# Patient Record
Sex: MALE | Race: WHITE | NOT HISPANIC OR LATINO | URBAN - METROPOLITAN AREA
[De-identification: names, ages, dates, MRNs, and addresses within clinical notes are randomized per-mention and may not be internally consistent; named-entity substitution may affect disease eponyms.]

---

## 2022-06-03 ENCOUNTER — WEB ENCOUNTER (OUTPATIENT)
Dept: URBAN - METROPOLITAN AREA CLINIC 111 | Facility: CLINIC | Age: 82
End: 2022-06-03

## 2022-06-08 ENCOUNTER — OFFICE VISIT (OUTPATIENT)
Dept: URBAN - METROPOLITAN AREA CLINIC 111 | Facility: CLINIC | Age: 82
End: 2022-06-08
Payer: MEDICARE

## 2022-06-08 ENCOUNTER — TELEPHONE ENCOUNTER (OUTPATIENT)
Dept: URBAN - METROPOLITAN AREA CLINIC 111 | Facility: CLINIC | Age: 82
End: 2022-06-08

## 2022-06-08 DIAGNOSIS — I48.91 AFIB: ICD-10-CM

## 2022-06-08 DIAGNOSIS — R19.7 DIARRHEA: ICD-10-CM

## 2022-06-08 DIAGNOSIS — C61 PROSTATE CANCER: ICD-10-CM

## 2022-06-08 DIAGNOSIS — K51.90 ULCERATIVE COLITIS: ICD-10-CM

## 2022-06-08 PROCEDURE — 99204 OFFICE O/P NEW MOD 45 MIN: CPT | Performed by: INTERNAL MEDICINE

## 2022-06-08 NOTE — HPI-TODAY'S VISIT:
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  Patient reported he has ulcerative colitis for more than 20 years is being followed by Dr. Alonzo Ball.  He was treated in 2017 with Remicade every 4 weeks and then it lost its response he had flare in 2019 started on Humira which was increased to every week recently he has increased diarrhea abdominal cramping with a blood in the stool he has 5-6 bowel movement a day he thinks Humira is not working.  He reported he has a colonoscopy by Dr. Ball a year and a half ago.  I do not have records at the time of this evaluation.  He has stage IV prostate cancer with metastasis to the bone he had radiation a year ago to the spine.  The diarrhea has worsened and affecting his quality of life He has history of factor fibrillation he is on Eliquis

## 2022-06-09 ENCOUNTER — LAB OUTSIDE AN ENCOUNTER (OUTPATIENT)
Dept: URBAN - METROPOLITAN AREA CLINIC 23 | Facility: CLINIC | Age: 82
End: 2022-06-09

## 2022-06-10 ENCOUNTER — TELEPHONE ENCOUNTER (OUTPATIENT)
Dept: URBAN - METROPOLITAN AREA CLINIC 111 | Facility: CLINIC | Age: 82
End: 2022-06-10

## 2022-06-10 ENCOUNTER — LAB OUTSIDE AN ENCOUNTER (OUTPATIENT)
Dept: URBAN - METROPOLITAN AREA CLINIC 23 | Facility: CLINIC | Age: 82
End: 2022-06-10

## 2022-06-13 LAB
CALPROTECTIN, STOOL - QDX: (no result)
GASTROINTESTINAL PATHOGEN: (no result)

## 2022-06-22 ENCOUNTER — TELEPHONE ENCOUNTER (OUTPATIENT)
Dept: URBAN - METROPOLITAN AREA CLINIC 23 | Facility: CLINIC | Age: 82
End: 2022-06-22

## 2022-06-22 RX ORDER — BUDESONIDE 3 MG/1
TAKE 3 CAPSULE CAPSULE, COATED PELLETS ORAL
Qty: 130 | Refills: 2 | OUTPATIENT
Start: 2022-06-22

## 2022-06-30 ENCOUNTER — TELEPHONE ENCOUNTER (OUTPATIENT)
Dept: URBAN - METROPOLITAN AREA CLINIC 92 | Facility: CLINIC | Age: 82
End: 2022-06-30

## 2022-06-30 LAB
A/G RATIO: 2.2
ADALIMUMAB DRUG LEVEL: 11
ALBUMIN: 4.8
ALKALINE PHOSPHATASE: 62
ANTI-ADALIMUMAB ANTIBODY: <25
AST (SGOT): 22
BASO (ABSOLUTE): 0
BASOS: 1
BILIRUBIN, TOTAL: 0.5
BUN/CREATININE RATIO: 28
BUN: 35
CALCIUM: 9.6
CARBON DIOXIDE, TOTAL: 21
CHLORIDE: 103
CREATININE: 1.23
EGFR: 58
EOS (ABSOLUTE): 0.2
EOS: 2
FERRITIN, SERUM: 49
GLOBULIN, TOTAL: 2.2
GLUCOSE: 112
HBSAG SCREEN: NEGATIVE
HEMATOCRIT: 40.9
HEMATOLOGY COMMENTS:: (no result)
HEMOGLOBIN: 13.3
IMMATURE CELLS: (no result)
IMMATURE GRANS (ABS): 0
IMMATURE GRANULOCYTES: 0
IRON BIND.CAP.(TIBC): 322
IRON SATURATION: 34
IRON: 108
LYMPHS (ABSOLUTE): 1.1
LYMPHS: 15
MCH: 31.6
MCHC: 32.5
MCV: 97
MONOCYTES(ABSOLUTE): 0.7
MONOCYTES: 9
NEUTROPHILS (ABSOLUTE): 5.1
NEUTROPHILS: 73
NRBC: (no result)
PLATELETS: 273
POTASSIUM: 4.7
PROTEIN, TOTAL: 7
RBC: 4.21
RDW: 11.9
SODIUM: 138
UIBC: 214
VITAMIN B12: 810
VITAMIN D, 25-HYDROXY: 44.3
WBC: 7.1

## 2022-07-26 ENCOUNTER — TELEPHONE ENCOUNTER (OUTPATIENT)
Dept: URBAN - METROPOLITAN AREA CLINIC 111 | Facility: CLINIC | Age: 82
End: 2022-07-26

## 2022-08-02 ENCOUNTER — OFFICE VISIT (OUTPATIENT)
Dept: URBAN - METROPOLITAN AREA CLINIC 23 | Facility: CLINIC | Age: 82
End: 2022-08-02
Payer: MEDICARE

## 2022-08-02 ENCOUNTER — WEB ENCOUNTER (OUTPATIENT)
Dept: URBAN - METROPOLITAN AREA CLINIC 23 | Facility: CLINIC | Age: 82
End: 2022-08-02

## 2022-08-02 VITALS
HEIGHT: 67 IN | HEART RATE: 73 BPM | DIASTOLIC BLOOD PRESSURE: 74 MMHG | WEIGHT: 157 LBS | TEMPERATURE: 96.8 F | BODY MASS INDEX: 24.64 KG/M2 | SYSTOLIC BLOOD PRESSURE: 114 MMHG

## 2022-08-02 DIAGNOSIS — K52.832 LYMPHOCYTIC COLITIS: ICD-10-CM

## 2022-08-02 DIAGNOSIS — R19.7 DIARRHEA: ICD-10-CM

## 2022-08-02 DIAGNOSIS — K51.90 ULCERATIVE COLITIS: ICD-10-CM

## 2022-08-02 DIAGNOSIS — C61 PROSTATE CANCER: ICD-10-CM

## 2022-08-02 DIAGNOSIS — I48.91 AFIB: ICD-10-CM

## 2022-08-02 PROCEDURE — 99214 OFFICE O/P EST MOD 30 MIN: CPT | Performed by: INTERNAL MEDICINE

## 2022-08-02 RX ORDER — BUDESONIDE 3 MG/1
TAKE 3 CAPSULE CAPSULE, COATED PELLETS ORAL
Qty: 130 | Refills: 2 | Status: ACTIVE | COMMUNITY
Start: 2022-06-22

## 2022-08-02 RX ORDER — ADALIMUMAB 40MG/0.4ML
1 SUBQ Q  WEEK KIT SUBCUTANEOUS
Qty: 4 | Refills: 3 | OUTPATIENT
Start: 2022-08-02 | End: 2022-11-29

## 2022-08-02 NOTE — HPI-TODAY'S VISIT:
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  I saw him 2 months ago for second opinion for worsening diarrhea.  He had stool studies which was unremarkable he had lab although was unremarkable his Humira level was normal and no antibody.  He was complaining of worsening diarrhea.  I reviewed his colonoscopy in 2021 which revealed right side lymphocytic colitis and left-sided ulcerative colitis.  The mucosa in the right side of the colon was normal but random biopsy showed lymphocytic colitis.  He is on Humira every week I added budesonide 9 mg a day and he reports his diarrhea improved significantly.  He has 2-3 bowel movement a day no abdominal pain.  .  He has stage IV prostate cancer with metastasis to the bone he had radiation a year ago to the spine.  He has history of factor fibrillation he is on Eliquis

## 2022-08-04 PROBLEM — 399068003: Status: ACTIVE | Noted: 2022-06-10

## 2022-08-04 PROBLEM — 1187071008 LYMPHOCYTIC COLITIS: Status: ACTIVE | Noted: 2022-08-02

## 2022-08-04 PROBLEM — 49436004 ATRIAL FIBRILLATION: Status: ACTIVE | Noted: 2022-06-08

## 2022-08-08 ENCOUNTER — TELEPHONE ENCOUNTER (OUTPATIENT)
Dept: URBAN - METROPOLITAN AREA CLINIC 23 | Facility: CLINIC | Age: 82
End: 2022-08-08

## 2022-08-08 RX ORDER — ADALIMUMAB 40MG/0.4ML
1 SUBQ Q  WEEK KIT SUBCUTANEOUS
Qty: 4 PEN NEEDLE | Refills: 11 | OUTPATIENT
Start: 2022-08-08 | End: 2023-08-03

## 2022-09-08 ENCOUNTER — TELEPHONE ENCOUNTER (OUTPATIENT)
Dept: URBAN - METROPOLITAN AREA CLINIC 111 | Facility: CLINIC | Age: 82
End: 2022-09-08

## 2022-10-11 ENCOUNTER — OFFICE VISIT (OUTPATIENT)
Dept: URBAN - METROPOLITAN AREA CLINIC 23 | Facility: CLINIC | Age: 82
End: 2022-10-11

## 2022-10-15 ENCOUNTER — OUT OF OFFICE VISIT (OUTPATIENT)
Dept: URBAN - METROPOLITAN AREA MEDICAL CENTER 27 | Facility: MEDICAL CENTER | Age: 82
End: 2022-10-15
Payer: MEDICARE

## 2022-10-15 DIAGNOSIS — R13.10 ABNORMAL DEGLUTITION: ICD-10-CM

## 2022-10-15 DIAGNOSIS — M46.42 CERVICAL DISCITIS: ICD-10-CM

## 2022-10-15 DIAGNOSIS — K51.80 CHRONIC PANCOLONIC ULCERATIVE COLITIS: ICD-10-CM

## 2022-10-15 DIAGNOSIS — K26.9 CHILDHOOD DUODENAL ULCER: ICD-10-CM

## 2022-10-15 DIAGNOSIS — K22.2 ACQUIRED ESOPHAGEAL RING: ICD-10-CM

## 2022-10-15 DIAGNOSIS — R13.12 DYSPHAGIA: ICD-10-CM

## 2022-10-15 PROCEDURE — G8427 DOCREV CUR MEDS BY ELIG CLIN: HCPCS | Performed by: INTERNAL MEDICINE

## 2022-10-15 PROCEDURE — 99232 SBSQ HOSP IP/OBS MODERATE 35: CPT | Performed by: INTERNAL MEDICINE

## 2022-10-15 PROCEDURE — 99223 1ST HOSP IP/OBS HIGH 75: CPT | Performed by: INTERNAL MEDICINE

## 2022-10-15 PROCEDURE — 43246 EGD PLACE GASTROSTOMY TUBE: CPT | Performed by: INTERNAL MEDICINE

## 2022-10-17 ENCOUNTER — OUT OF OFFICE VISIT (OUTPATIENT)
Dept: URBAN - METROPOLITAN AREA MEDICAL CENTER 27 | Facility: MEDICAL CENTER | Age: 82
End: 2022-10-17
Payer: MEDICARE

## 2022-10-17 DIAGNOSIS — K51.80 CHRONIC PANCOLONIC ULCERATIVE COLITIS: ICD-10-CM

## 2022-10-17 DIAGNOSIS — M46.42 CERVICAL DISCITIS: ICD-10-CM

## 2022-10-17 DIAGNOSIS — R13.10 ABNORMAL DEGLUTITION: ICD-10-CM

## 2022-10-17 PROCEDURE — 99232 SBSQ HOSP IP/OBS MODERATE 35: CPT | Performed by: INTERNAL MEDICINE

## 2022-10-19 ENCOUNTER — OUT OF OFFICE VISIT (OUTPATIENT)
Dept: URBAN - METROPOLITAN AREA MEDICAL CENTER 27 | Facility: MEDICAL CENTER | Age: 82
End: 2022-10-19
Payer: MEDICARE

## 2022-10-19 DIAGNOSIS — K26.9 CHILDHOOD DUODENAL ULCER: ICD-10-CM

## 2022-10-19 DIAGNOSIS — R13.10 ABNORMAL DEGLUTITION: ICD-10-CM

## 2022-10-19 DIAGNOSIS — K51.80 CHRONIC PANCOLONIC ULCERATIVE COLITIS: ICD-10-CM

## 2022-10-19 DIAGNOSIS — M46.42 CERVICAL DISCITIS: ICD-10-CM

## 2022-10-19 PROCEDURE — 99232 SBSQ HOSP IP/OBS MODERATE 35: CPT | Performed by: INTERNAL MEDICINE

## 2022-10-26 ENCOUNTER — TELEPHONE ENCOUNTER (OUTPATIENT)
Dept: URBAN - METROPOLITAN AREA CLINIC 23 | Facility: CLINIC | Age: 82
End: 2022-10-26

## 2022-11-07 ENCOUNTER — TELEPHONE ENCOUNTER (OUTPATIENT)
Dept: URBAN - METROPOLITAN AREA CLINIC 23 | Facility: CLINIC | Age: 82
End: 2022-11-07

## 2022-12-01 ENCOUNTER — OFFICE VISIT (OUTPATIENT)
Dept: URBAN - METROPOLITAN AREA CLINIC 12 | Facility: CLINIC | Age: 82
End: 2022-12-01
Payer: MEDICARE

## 2022-12-01 VITALS
HEIGHT: 69 IN | DIASTOLIC BLOOD PRESSURE: 74 MMHG | SYSTOLIC BLOOD PRESSURE: 113 MMHG | WEIGHT: 166.8 LBS | TEMPERATURE: 97.7 F | BODY MASS INDEX: 24.71 KG/M2 | HEART RATE: 72 BPM

## 2022-12-01 DIAGNOSIS — K51.90 ULCERATIVE COLITIS: ICD-10-CM

## 2022-12-01 DIAGNOSIS — Z93.1 PEG (PERCUTANEOUS ENDOSCOPIC GASTROSTOMY) STATUS: ICD-10-CM

## 2022-12-01 DIAGNOSIS — R13.10 DYSPHAGIA: ICD-10-CM

## 2022-12-01 DIAGNOSIS — M46.22 OSTEOMYELITIS OF CERVICAL SPINE: ICD-10-CM

## 2022-12-01 PROCEDURE — 99214 OFFICE O/P EST MOD 30 MIN: CPT | Performed by: INTERNAL MEDICINE

## 2022-12-01 RX ORDER — BUDESONIDE 3 MG/1
TAKE 3 CAPSULE CAPSULE, COATED PELLETS ORAL
Qty: 130 | Refills: 2 | Status: ACTIVE | COMMUNITY
Start: 2022-06-22

## 2022-12-01 RX ORDER — ADALIMUMAB 40MG/0.4ML
1 SUBQ Q  WEEK KIT SUBCUTANEOUS
Qty: 4 PEN NEEDLE | Refills: 11 | Status: ACTIVE | COMMUNITY
Start: 2022-08-08 | End: 2023-08-03

## 2022-12-01 NOTE — HPI-TODAY'S VISIT:
82-year-old male with history of ulcerative colitis history of stage IV prostate cancer metastasized to the bone presented today for follow up after recent admission to the hospital in October 2022 for C4C9 osteomyelitis aspiration pneumonia oropharyngeal dysphagia.  Patient was treated with IV antibiotic he developed severe oropharyngeal dysphagia requiring feeding tube placement his Humira was held because of active sepsis and infection he is currently using tube feeding and tolerating it well his ulcerative colitis is in remission he has 1-2 bowel movement a day no blood in the stool no abdominal pain he gained weight since last visit he feels better.  He still has PICC line with IV treatment.   his colonoscopy in 2021 which revealed right side lymphocytic colitis and left-sided ulcerative colitis.  The mucosa in the right side of the colon was normal but random biopsy showed lymphocytic colitis.   .  He has stage IV prostate cancer with metastasis to the bone he had radiation a year ago to the spine.  He has history of factor fibrillation he is on Eliquis

## 2022-12-01 NOTE — PHYSICAL EXAM GASTROINTESTINAL
Abdomen , soft, nontender, nondistended , no guarding or rigidity , no masses palpable , normal bowel sounds , Liver and Spleen,  no hepatosplenomegaly , liver nontender feeding tube placed

## 2022-12-07 ENCOUNTER — TELEPHONE ENCOUNTER (OUTPATIENT)
Dept: URBAN - METROPOLITAN AREA CLINIC 23 | Facility: CLINIC | Age: 82
End: 2022-12-07

## 2023-02-03 ENCOUNTER — OFFICE VISIT (OUTPATIENT)
Dept: URBAN - METROPOLITAN AREA CLINIC 23 | Facility: CLINIC | Age: 83
End: 2023-02-03
Payer: MEDICARE

## 2023-02-03 ENCOUNTER — LAB OUTSIDE AN ENCOUNTER (OUTPATIENT)
Dept: URBAN - METROPOLITAN AREA CLINIC 23 | Facility: CLINIC | Age: 83
End: 2023-02-03

## 2023-02-03 VITALS
HEART RATE: 76 BPM | DIASTOLIC BLOOD PRESSURE: 73 MMHG | SYSTOLIC BLOOD PRESSURE: 115 MMHG | TEMPERATURE: 97 F | WEIGHT: 170 LBS | HEIGHT: 69 IN | BODY MASS INDEX: 25.18 KG/M2

## 2023-02-03 DIAGNOSIS — M46.22 OSTEOMYELITIS OF CERVICAL SPINE: ICD-10-CM

## 2023-02-03 DIAGNOSIS — Z93.1 PEG (PERCUTANEOUS ENDOSCOPIC GASTROSTOMY) STATUS: ICD-10-CM

## 2023-02-03 DIAGNOSIS — K51.90 ULCERATIVE COLITIS: ICD-10-CM

## 2023-02-03 DIAGNOSIS — R13.10 DYSPHAGIA: ICD-10-CM

## 2023-02-03 PROCEDURE — 99214 OFFICE O/P EST MOD 30 MIN: CPT | Performed by: INTERNAL MEDICINE

## 2023-02-03 RX ORDER — ADALIMUMAB 40MG/0.4ML
1 SUBQ Q  WEEK KIT SUBCUTANEOUS
Qty: 4 PEN NEEDLE | Refills: 11 | Status: ACTIVE | COMMUNITY
Start: 2022-08-08 | End: 2023-08-03

## 2023-02-03 RX ORDER — BUDESONIDE 3 MG/1
TAKE 3 CAPSULE CAPSULE, COATED PELLETS ORAL
Qty: 130 | Refills: 2 | Status: ACTIVE | COMMUNITY
Start: 2022-06-22

## 2023-02-03 NOTE — HPI-TODAY'S VISIT:
82-year-old male with history of ulcerative colitis history of stage IV prostate cancer metastasized to the bone presented today for follow up, he had feeding tube placed October 2022 for severe C4 to C9 osteomyelitis with aspiration.  He finish IV antibiotic treatment his dysphagia improved significantly he reported his not using the feeding tube anymore and would like to remove it.  He is on Eliquis for factor V Leiden and A-fib with hypercoagulable status.  He is off Humira since October 2022 reports he feels better no recurrent symptoms.  his ulcerative colitis is in remission he has 1-2 bowel movement a day no blood in the stool no abdominal pain he gained weight since last visit he feels better.     his colonoscopy in 2021 which revealed right side lymphocytic colitis and left-sided ulcerative colitis.  The mucosa in the right side of the colon was normal but random biopsy showed lymphocytic colitis.   .  He has stage IV prostate cancer with metastasis to the bone he had radiation a year ago to the spine.  He has history of factor V and atrial fibrillation he is on Eliquis

## 2023-02-04 PROBLEM — 64766004 ULCERATIVE COLITIS: Status: ACTIVE | Noted: 2022-06-08

## 2023-02-04 PROBLEM — 40739000 DYSPHAGIA: Status: ACTIVE | Noted: 2022-12-01

## 2023-02-04 PROBLEM — 302109006: Status: ACTIVE | Noted: 2022-12-01

## 2023-02-17 ENCOUNTER — OFFICE VISIT (OUTPATIENT)
Dept: URBAN - METROPOLITAN AREA MEDICAL CENTER 27 | Facility: MEDICAL CENTER | Age: 83
End: 2023-02-17
Payer: MEDICARE

## 2023-02-17 DIAGNOSIS — T18.2XXA FOREIGN BODY IN STOMACH: ICD-10-CM

## 2023-02-17 PROCEDURE — 43247 EGD REMOVE FOREIGN BODY: CPT | Performed by: INTERNAL MEDICINE

## 2023-09-11 ENCOUNTER — OFFICE VISIT (OUTPATIENT)
Dept: URBAN - METROPOLITAN AREA CLINIC 111 | Facility: CLINIC | Age: 83
End: 2023-09-11
Payer: MEDICARE

## 2023-09-11 VITALS
TEMPERATURE: 98.3 F | WEIGHT: 170 LBS | HEART RATE: 73 BPM | HEIGHT: 69 IN | SYSTOLIC BLOOD PRESSURE: 111 MMHG | BODY MASS INDEX: 25.18 KG/M2 | DIASTOLIC BLOOD PRESSURE: 67 MMHG

## 2023-09-11 DIAGNOSIS — I48.91 ATRIAL FIBRILLATION, UNSPECIFIED TYPE: ICD-10-CM

## 2023-09-11 DIAGNOSIS — K51.90 ULCERATIVE COLITIS: ICD-10-CM

## 2023-09-11 DIAGNOSIS — Z85.46 HISTORY OF PROSTATE CANCER: ICD-10-CM

## 2023-09-11 PROCEDURE — 99214 OFFICE O/P EST MOD 30 MIN: CPT | Performed by: NURSE PRACTITIONER

## 2023-09-11 RX ORDER — BUDESONIDE 3 MG/1
AS DIRECTED CAPSULE ORAL ONCE A DAY
Qty: 63 | Refills: 0 | OUTPATIENT
Start: 2023-09-11

## 2023-09-11 RX ORDER — BUDESONIDE 3 MG/1
TAKE 3 CAPSULE CAPSULE, COATED PELLETS ORAL
Qty: 130 | Refills: 2 | Status: ACTIVE | COMMUNITY
Start: 2022-06-22

## 2023-09-11 NOTE — HPI-TODAY'S VISIT:
82 yo male patient with pmh of ulcerative colitis history of stage IV prostate cancer metastasized to the bone  presents with son for UC flare up.  Reports bloody diarrhea one to two times a day, and passing gas with an egg-white consistency. He denies weight loss, abdominal pain, and fever but experiences occasional hot flashes. He has a history of ulcerative colitis and has been experiencing these symptoms for approximately two weeks.  He has previously been on Remicade and Humira for colitis management. He stopped taking Remicade, thinking he was cured, and later switched to Humira due to the development of antibodies. He stopped taking Humira last year after developing a staph infection on C5 and C6, which required a feeding tube and antibiotic treatment. The colitis appeared to be in remission during the tube feeding period, but symptoms have recently returned. He also has a history of prostate cancer. No recent blood work or colonoscopy has been performed.  Patient was last seen by Dr. Mazariegos in 2/23 for follow up, he had feeding tube placed October 2022 for severe C4 to C9 osteomyelitis with aspiration.  He finished IV antibiotic treatment his dysphagia improved significantly he reported his not using the feeding tube anymore and would like to remove it.  He is on Eliquis for factor V Leiden and A-fib with hypercoagulable status.  He is off Humira since October 2022 reports he feels better no recurrent symptoms at that time.   Last colonoscopy in 2021 which revealed right side lymphocytic colitis and left-sided ulcerative colitis.  The mucosa on the right side of the colon was normal but random biopsy showed lymphocytic colitis.

## 2023-09-14 ENCOUNTER — TELEPHONE ENCOUNTER (OUTPATIENT)
Dept: URBAN - METROPOLITAN AREA CLINIC 111 | Facility: CLINIC | Age: 83
End: 2023-09-14

## 2023-09-14 PROBLEM — 87522002: Status: ACTIVE | Noted: 2023-09-14

## 2023-09-14 LAB
% SATURATION: 18
A/G RATIO: 1.9
ABSOLUTE BASOPHILS: 38
ABSOLUTE EOSINOPHILS: 128
ABSOLUTE LYMPHOCYTES: 923
ABSOLUTE MONOCYTES: 563
ABSOLUTE NEUTROPHILS: 5850
ALBUMIN: 4
ALKALINE PHOSPHATASE: 45
ALT (SGPT): 12
AST (SGOT): 18
BASOPHILS: 0.5
BILIRUBIN, TOTAL: 0.5
BUN/CREATININE RATIO: 22
BUN: 32
C-REACTIVE PROTEIN, QUANT: 2.8
CALCIUM: 9.2
CARBON DIOXIDE, TOTAL: 24
CHLORIDE: 107
CREATININE: 1.48
EGFR: 46
EOSINOPHILS: 1.7
FERRITIN: 32
GLOBULIN, TOTAL: 2.1
GLUCOSE: 128
HEMATOCRIT: 34.1
HEMOGLOBIN: 11.7
IRON BINDING CAPACITY: 294
IRON, TOTAL: 53
LYMPHOCYTES: 12.3
MCH: 32
MCHC: 34.3
MCV: 93.2
MONOCYTES: 7.5
MPV: 11.1
NEUTROPHILS: 78
PLATELET COUNT: 287
POTASSIUM: 4.5
PROTEIN, TOTAL: 6.1
RDW: 13
RED BLOOD CELL COUNT: 3.66
SODIUM: 140
WHITE BLOOD CELL COUNT: 7.5

## 2023-09-14 RX ORDER — BUDESONIDE 3 MG/1
AS DIRECTED CAPSULE ORAL ONCE A DAY
Qty: 63 | Refills: 0 | Status: ACTIVE | COMMUNITY
Start: 2023-09-11

## 2023-09-14 RX ORDER — BUDESONIDE 3 MG/1
TAKE 3 CAPSULE CAPSULE, COATED PELLETS ORAL
Qty: 130 | Refills: 2 | Status: ACTIVE | COMMUNITY
Start: 2022-06-22

## 2023-09-14 RX ORDER — FERRIC MALTOL 30 MG/1
1 CAPSULE 1 HOUR BEFORE OR 2 HOURS AFTER MEALS CAPSULE ORAL TWICE A DAY
Qty: 60 | Status: ACTIVE | COMMUNITY
Start: 2023-09-14

## 2023-09-14 RX ORDER — FERRIC MALTOL 30 MG/1
1 CAPSULE 1 HOUR BEFORE OR 2 HOURS AFTER MEALS CAPSULE ORAL TWICE A DAY
Qty: 60 | OUTPATIENT
Start: 2023-09-14

## 2023-09-18 ENCOUNTER — OFFICE VISIT (OUTPATIENT)
Dept: URBAN - METROPOLITAN AREA CLINIC 111 | Facility: CLINIC | Age: 83
End: 2023-09-18

## 2023-09-22 ENCOUNTER — LAB OUTSIDE AN ENCOUNTER (OUTPATIENT)
Dept: URBAN - METROPOLITAN AREA CLINIC 111 | Facility: CLINIC | Age: 83
End: 2023-09-22

## 2023-09-25 ENCOUNTER — OFFICE VISIT (OUTPATIENT)
Dept: URBAN - METROPOLITAN AREA CLINIC 111 | Facility: CLINIC | Age: 83
End: 2023-09-25

## 2023-09-27 ENCOUNTER — OFFICE VISIT (OUTPATIENT)
Dept: URBAN - METROPOLITAN AREA CLINIC 111 | Facility: CLINIC | Age: 83
End: 2023-09-27
Payer: MEDICARE

## 2023-09-27 ENCOUNTER — WEB ENCOUNTER (OUTPATIENT)
Dept: URBAN - METROPOLITAN AREA CLINIC 111 | Facility: CLINIC | Age: 83
End: 2023-09-27

## 2023-09-27 VITALS
HEIGHT: 69 IN | HEART RATE: 68 BPM | WEIGHT: 168 LBS | SYSTOLIC BLOOD PRESSURE: 107 MMHG | DIASTOLIC BLOOD PRESSURE: 64 MMHG | BODY MASS INDEX: 24.88 KG/M2 | TEMPERATURE: 97.7 F

## 2023-09-27 DIAGNOSIS — K51.90 ULCERATIVE COLITIS: ICD-10-CM

## 2023-09-27 DIAGNOSIS — N28.9 DECREASED RENAL FUNCTION: ICD-10-CM

## 2023-09-27 DIAGNOSIS — D50.9 IRON DEFICIENCY ANEMIA, UNSPECIFIED IRON DEFICIENCY ANEMIA TYPE: ICD-10-CM

## 2023-09-27 PROBLEM — 49436004: Status: ACTIVE | Noted: 2023-09-27

## 2023-09-27 PROBLEM — 428262008: Status: ACTIVE | Noted: 2023-09-27

## 2023-09-27 LAB — CALPROTECTIN, FECAL: 1110

## 2023-09-27 PROCEDURE — 99214 OFFICE O/P EST MOD 30 MIN: CPT | Performed by: NURSE PRACTITIONER

## 2023-09-27 RX ORDER — FERRIC MALTOL 30 MG/1
1 CAPSULE 1 HOUR BEFORE OR 2 HOURS AFTER MEALS CAPSULE ORAL TWICE A DAY
Qty: 60 | Status: ACTIVE | COMMUNITY
Start: 2023-09-14

## 2023-09-27 RX ORDER — BUDESONIDE 3 MG/1
AS DIRECTED CAPSULE ORAL ONCE A DAY
Qty: 63 | Refills: 0 | Status: ACTIVE | COMMUNITY
Start: 2023-09-11

## 2023-09-27 RX ORDER — BUDESONIDE 3 MG/1
TAKE 3 CAPSULE CAPSULE, COATED PELLETS ORAL
Qty: 130 | Refills: 2 | Status: ACTIVE | COMMUNITY
Start: 2022-06-22

## 2023-09-27 RX ORDER — FERRIC MALTOL 30 MG/1
1 CAPSULE 1 HOUR BEFORE OR 2 HOURS AFTER MEALS CAPSULE ORAL TWICE A DAY
Qty: 60 | Refills: 3 | OUTPATIENT
Start: 2023-09-27

## 2023-09-27 RX ORDER — OZANIMOD HYDROCHLORIDE 0.92 MG/1
1 CAPSULE CAPSULE ORAL ONCE A DAY
Qty: 30 | Refills: 3 | OUTPATIENT
Start: 2023-10-01 | End: 2023-10-31

## 2023-09-27 NOTE — HPI-TODAY'S VISIT:
82 yo male patient with hx of UC  and  stage IV prostate cancer metastasized to the bone presents with son who is helping with hpi for 2 weeks follow up. He is currently taking budesonide that was prescribed 2 weeks ago. He continues to have bloody diarrhea despite of starting steroid for 2 weeks. Denies fever, chills or abd pain.  Rev'd lab results on 9/13/23 details, iron sat 18, h/h 11.7/34.5, wbc 7.5, crp 2.8. Stool calprotectin was 1100 on 9/27/23.  The patient's renal function is compromised, as indicated by a decreased glomerular filtration rate (GFR) of 46.  Last colonoscopy in 2021 which revealed right side lymphocytic colitis and left-sided ulcerative colitis.  The mucosa in the right side of the colon was normal but random biopsy showed lymphocytic colitis. He previously used remicade and humira for hx UC.  He has stage IV prostate cancer with metastasis to the bone he had radiation a year ago to the spine.  He has history of factor V and atrial fibrillation he is on Eliquis

## 2023-09-28 ENCOUNTER — TELEPHONE ENCOUNTER (OUTPATIENT)
Dept: URBAN - METROPOLITAN AREA CLINIC 5 | Facility: CLINIC | Age: 83
End: 2023-09-28

## 2023-09-28 RX ORDER — BUDESONIDE 3 MG/1
AS DIRECTED CAPSULE ORAL ONCE A DAY
Qty: 63 | Refills: 0 | Status: ACTIVE | COMMUNITY
Start: 2023-09-11

## 2023-09-28 RX ORDER — FERRIC MALTOL 30 MG/1
1 CAPSULE 1 HOUR BEFORE OR 2 HOURS AFTER MEALS CAPSULE ORAL TWICE A DAY
Qty: 60 | Refills: 3 | Status: ACTIVE | COMMUNITY
Start: 2023-09-27

## 2023-09-28 RX ORDER — FERRIC MALTOL 30 MG/1
1 CAPSULE 1 HOUR BEFORE OR 2 HOURS AFTER MEALS CAPSULE ORAL TWICE A DAY
Qty: 60 | Status: ACTIVE | COMMUNITY
Start: 2023-09-14

## 2023-09-28 RX ORDER — OZANIMOD HYDROCHLORIDE 0.92 MG/1
1 CAPSULE CAPSULE ORAL ONCE A DAY
Qty: 30 | Refills: 3 | OUTPATIENT
Start: 2023-09-28 | End: 2023-10-28

## 2023-09-28 RX ORDER — BUDESONIDE 3 MG/1
TAKE 3 CAPSULE CAPSULE, COATED PELLETS ORAL
Qty: 130 | Refills: 2 | Status: ACTIVE | COMMUNITY
Start: 2022-06-22

## 2023-10-05 ENCOUNTER — TELEPHONE ENCOUNTER (OUTPATIENT)
Dept: URBAN - METROPOLITAN AREA CLINIC 5 | Facility: CLINIC | Age: 83
End: 2023-10-05

## 2023-10-05 RX ORDER — FERRIC MALTOL 30 MG/1
1 CAPSULE 1 HOUR BEFORE OR 2 HOURS AFTER MEALS CAPSULE ORAL TWICE A DAY
Qty: 60 | Status: ACTIVE | COMMUNITY
Start: 2023-09-14

## 2023-10-05 RX ORDER — BUDESONIDE 3 MG/1
TAKE 3 CAPSULE CAPSULE, COATED PELLETS ORAL
Qty: 130 | Refills: 2 | Status: ACTIVE | COMMUNITY
Start: 2022-06-22

## 2023-10-05 RX ORDER — OZANIMOD HYDROCHLORIDE 0.92 MG/1
1 CAPSULE CAPSULE ORAL ONCE A DAY
Qty: 30 | Refills: 3 | Status: ACTIVE | COMMUNITY
Start: 2023-09-28 | End: 2023-10-28

## 2023-10-05 RX ORDER — PREDNISONE 20 MG/1
AS DIRECTED TABLET ORAL ONCE A DAY
Qty: 25 | OUTPATIENT
Start: 2023-10-06 | End: 2023-11-05

## 2023-10-05 RX ORDER — BUDESONIDE 3 MG/1
AS DIRECTED CAPSULE ORAL ONCE A DAY
Qty: 63 | Refills: 0 | Status: ACTIVE | COMMUNITY
Start: 2023-09-11

## 2023-10-05 RX ORDER — FERRIC MALTOL 30 MG/1
1 CAPSULE 1 HOUR BEFORE OR 2 HOURS AFTER MEALS CAPSULE ORAL TWICE A DAY
Qty: 60 | Refills: 3 | Status: ACTIVE | COMMUNITY
Start: 2023-09-27

## 2023-10-05 RX ORDER — OZANIMOD HYDROCHLORIDE 0.92 MG/1
1 CAPSULE CAPSULE ORAL ONCE A DAY
Qty: 30 | Refills: 3 | Status: ACTIVE | COMMUNITY
Start: 2023-10-01 | End: 2023-10-31

## 2023-10-06 ENCOUNTER — TELEPHONE ENCOUNTER (OUTPATIENT)
Dept: URBAN - METROPOLITAN AREA CLINIC 5 | Facility: CLINIC | Age: 83
End: 2023-10-06

## 2023-10-27 ENCOUNTER — OFFICE VISIT (OUTPATIENT)
Dept: URBAN - METROPOLITAN AREA CLINIC 111 | Facility: CLINIC | Age: 83
End: 2023-10-27
Payer: MEDICARE

## 2023-10-27 VITALS
BODY MASS INDEX: 25.33 KG/M2 | TEMPERATURE: 97.5 F | WEIGHT: 171 LBS | DIASTOLIC BLOOD PRESSURE: 72 MMHG | HEIGHT: 69 IN | HEART RATE: 67 BPM | SYSTOLIC BLOOD PRESSURE: 119 MMHG

## 2023-10-27 DIAGNOSIS — K51.90 ULCERATIVE COLITIS: ICD-10-CM

## 2023-10-27 DIAGNOSIS — D50.9 IRON DEFICIENCY ANEMIA, UNSPECIFIED IRON DEFICIENCY ANEMIA TYPE: ICD-10-CM

## 2023-10-27 PROCEDURE — 99213 OFFICE O/P EST LOW 20 MIN: CPT | Performed by: NURSE PRACTITIONER

## 2023-10-27 RX ORDER — OZANIMOD HYDROCHLORIDE 0.92 MG/1
1 CAPSULE CAPSULE ORAL ONCE A DAY
Qty: 30 | Refills: 3 | Status: ACTIVE | COMMUNITY
Start: 2023-10-01 | End: 2023-10-31

## 2023-10-27 RX ORDER — PREDNISONE 20 MG/1
AS DIRECTED TABLET ORAL ONCE A DAY
Qty: 25 | Status: ACTIVE | COMMUNITY
Start: 2023-10-06 | End: 2023-11-05

## 2023-10-27 RX ORDER — BUDESONIDE 3 MG/1
AS DIRECTED CAPSULE ORAL ONCE A DAY
Qty: 63 | Refills: 0 | Status: ACTIVE | COMMUNITY
Start: 2023-09-11

## 2023-10-27 RX ORDER — FERRIC MALTOL 30 MG/1
1 CAPSULE 1 HOUR BEFORE OR 2 HOURS AFTER MEALS CAPSULE ORAL TWICE A DAY
Qty: 60 | Refills: 3 | Status: ACTIVE | COMMUNITY
Start: 2023-09-27

## 2023-10-27 NOTE — HPI-TODAY'S VISIT:
84 yo male patient with hx of UC, CATARINO  and  stage IV prostate cancer metastasized to the bone presents with son who is helping with hpi for 2 weeks follow up. Patient was last seen by me on 9/27/23. He continued to have bloody diarrhea despite of taking budesonide for 4 weeks. His symptoms has resolved since started prednisone at last visit. Denies fever, chills, abd pain, diarrhea or blood in the stool. He was also started zeoposia on 9/11/23. Labs on 9/13/23 iron sat 18, h/h 11.7/34.5, wbc 7.5, crp 2.8. Stool calprotectin was 1100 on 9/27/23. Last colonoscopy in 2021 which revealed right side lymphocytic colitis and left-sided ulcerative colitis.  The mucosa in the right side of the colon was normal but random biopsy showed lymphocytic colitis. He previously used remicade and humira which both have stopped working.

## 2023-11-03 ENCOUNTER — TELEPHONE ENCOUNTER (OUTPATIENT)
Dept: URBAN - METROPOLITAN AREA CLINIC 111 | Facility: CLINIC | Age: 83
End: 2023-11-03

## 2024-01-25 ENCOUNTER — OFFICE VISIT (OUTPATIENT)
Dept: URBAN - METROPOLITAN AREA CLINIC 23 | Facility: CLINIC | Age: 84
End: 2024-01-25
Payer: MEDICARE

## 2024-01-25 VITALS
DIASTOLIC BLOOD PRESSURE: 75 MMHG | WEIGHT: 171 LBS | SYSTOLIC BLOOD PRESSURE: 125 MMHG | HEART RATE: 61 BPM | TEMPERATURE: 97.5 F | BODY MASS INDEX: 25.33 KG/M2 | HEIGHT: 69 IN

## 2024-01-25 DIAGNOSIS — D50.9 IRON DEFICIENCY ANEMIA, UNSPECIFIED IRON DEFICIENCY ANEMIA TYPE: ICD-10-CM

## 2024-01-25 DIAGNOSIS — K51.90 ULCERATIVE COLITIS: ICD-10-CM

## 2024-01-25 PROBLEM — 724556004: Status: ACTIVE | Noted: 2024-01-25

## 2024-01-25 PROCEDURE — 99214 OFFICE O/P EST MOD 30 MIN: CPT | Performed by: NURSE PRACTITIONER

## 2024-01-25 RX ORDER — OZANIMOD HYDROCHLORIDE 0.92 MG/1
1 CAPSULE CAPSULE ORAL ONCE A DAY
Qty: 90 CAPSULE | Refills: 1

## 2024-01-25 NOTE — HPI-TODAY'S VISIT:
82 yo male patient with hx of UC, CATARINO  and  stage IV prostate cancer metastasized to the bone presents with son  for 3 months follow up. Patient was last seen by me on 10/27/23. He was feeling well at that time after finishing prednisone in September and starting Zeposia. C diff was negative in October 2023. Patient continues to do well on Zeposia. Has normal bm 1-2x/day. Denies fever, chills, abd pain, diarrhea or blood in the stool. Labs on 9/13/23 iron sat 18, h/h 11.7/34.5, wbc 7.5, crp 2.8. Stool calprotectin was 1100 on 9/27/23. Labs ordered on last visit 10/27/23 was not completed. Patient stopped irom supplement about one month ago as it ran out. Last colonoscopy in 2021 which revealed right side lymphocytic colitis and left-sided ulcerative colitis.  The mucosa on the right side of the colon was normal but random biopsy showed lymphocytic colitis. He previously used remicade and humira which both have stopped working.

## 2024-01-26 ENCOUNTER — OFFICE VISIT (OUTPATIENT)
Dept: URBAN - METROPOLITAN AREA CLINIC 23 | Facility: CLINIC | Age: 84
End: 2024-01-26

## 2024-01-29 ENCOUNTER — OFFICE VISIT (OUTPATIENT)
Dept: URBAN - METROPOLITAN AREA CLINIC 23 | Facility: CLINIC | Age: 84
End: 2024-01-29

## 2024-01-31 ENCOUNTER — TELEPHONE ENCOUNTER (OUTPATIENT)
Dept: URBAN - METROPOLITAN AREA CLINIC 5 | Facility: CLINIC | Age: 84
End: 2024-01-31

## 2024-02-06 LAB
% SATURATION: 29
A/G RATIO: 1.9
ABSOLUTE BASOPHILS: 30
ABSOLUTE EOSINOPHILS: 281
ABSOLUTE LYMPHOCYTES: 471
ABSOLUTE MONOCYTES: 631
ABSOLUTE NEUTROPHILS: 6186
ALBUMIN: 4.2
ALKALINE PHOSPHATASE: 46
ALT (SGPT): 11
AST (SGOT): 19
BASOPHILS: 0.4
BILIRUBIN, TOTAL: 0.4
BUN/CREATININE RATIO: 24
BUN: 30
C-REACTIVE PROTEIN, QUANT: 1.2
CALCIUM: 9.2
CARBON DIOXIDE, TOTAL: 26
CHLORIDE: 108
CREATININE: 1.27
EGFR: 56
EOSINOPHILS: 3.7
FERRITIN: 22
GLOBULIN, TOTAL: 2.2
GLUCOSE: 100
HEMATOCRIT: 36.9
HEMOGLOBIN: 12.6
IRON BINDING CAPACITY: 300
IRON, TOTAL: 87
LYMPHOCYTES: 6.2
MCH: 31.3
MCHC: 34.1
MCV: 91.8
MONOCYTES: 8.3
MPV: 11.4
NEUTROPHILS: 81.4
PLATELET COUNT: 245
POTASSIUM: 4.1
PROTEIN, TOTAL: 6.4
RDW: 13.3
RED BLOOD CELL COUNT: 4.02
SODIUM: 143
WHITE BLOOD CELL COUNT: 7.6

## 2024-02-12 LAB — CALPROTECTIN, FECAL: 84

## 2024-02-19 ENCOUNTER — LAB (OUTPATIENT)
Dept: URBAN - METROPOLITAN AREA MEDICAL CENTER 27 | Facility: MEDICAL CENTER | Age: 84
End: 2024-02-19
Payer: MEDICARE

## 2024-02-19 DIAGNOSIS — R78.81 BACTEREMIA: ICD-10-CM

## 2024-02-19 DIAGNOSIS — K51.80 CHRONIC PANCOLONIC ULCERATIVE COLITIS: ICD-10-CM

## 2024-02-19 PROCEDURE — G8427 DOCREV CUR MEDS BY ELIG CLIN: HCPCS | Performed by: INTERNAL MEDICINE

## 2024-02-19 PROCEDURE — 99254 IP/OBS CNSLTJ NEW/EST MOD 60: CPT | Performed by: INTERNAL MEDICINE

## 2024-02-19 PROCEDURE — 99222 1ST HOSP IP/OBS MODERATE 55: CPT | Performed by: INTERNAL MEDICINE

## 2024-02-20 ENCOUNTER — LAB (OUTPATIENT)
Dept: URBAN - METROPOLITAN AREA MEDICAL CENTER 27 | Facility: MEDICAL CENTER | Age: 84
End: 2024-02-20
Payer: MEDICARE

## 2024-02-20 DIAGNOSIS — R78.81 BACTEREMIA: ICD-10-CM

## 2024-02-20 DIAGNOSIS — K51.80 CHRONIC PANCOLONIC ULCERATIVE COLITIS: ICD-10-CM

## 2024-02-20 PROCEDURE — 99232 SBSQ HOSP IP/OBS MODERATE 35: CPT | Performed by: INTERNAL MEDICINE

## 2024-03-28 ENCOUNTER — OV EP (OUTPATIENT)
Dept: URBAN - METROPOLITAN AREA CLINIC 111 | Facility: CLINIC | Age: 84
End: 2024-03-28
Payer: MEDICARE

## 2024-03-28 VITALS
DIASTOLIC BLOOD PRESSURE: 69 MMHG | SYSTOLIC BLOOD PRESSURE: 115 MMHG | WEIGHT: 171.6 LBS | HEART RATE: 73 BPM | TEMPERATURE: 98.6 F | HEIGHT: 69 IN | BODY MASS INDEX: 25.42 KG/M2

## 2024-03-28 DIAGNOSIS — K51.90 ULCERATIVE COLITIS: ICD-10-CM

## 2024-03-28 DIAGNOSIS — R19.7 DIARRHEA: ICD-10-CM

## 2024-03-28 PROCEDURE — 99214 OFFICE O/P EST MOD 30 MIN: CPT | Performed by: PHYSICIAN ASSISTANT

## 2024-03-28 RX ORDER — OZANIMOD HYDROCHLORIDE 0.92 MG/1
1 CAPSULE CAPSULE ORAL ONCE A DAY
Qty: 90 CAPSULE | Refills: 3

## 2024-03-28 RX ORDER — OZANIMOD HYDROCHLORIDE 0.92 MG/1
1 CAPSULE CAPSULE ORAL ONCE A DAY
Qty: 90 CAPSULE | Refills: 1 | Status: ACTIVE | COMMUNITY

## 2024-03-28 NOTE — HPI-TODAY'S VISIT:
84 y/o male with ulcerative colitis here to follow up after recent hospitalization when Zeposia was held. He needs to know whether to resume it or change treatment. Last seen on 1/25 by NP. Pt also has h/o metastatic prostate cancer. He has been on Zeposia which was refilled. He has been doing well on it. Pt had labs ordered. Labs okay. Calprotectin and CRP WNL. Calpro previously high. H/o iron def anemia and had stopped iron. Iron WNL last month. Ferritin and Hgb mildly low. Colonoscopy in 2/2021 with another GI revealed lymphocytic colitis in right colon and active colitis in left. Previously took Remicade & Humira.  Pt is here with his son. Pt had stopped Zeposia d/t infection E. coli bacteremia last month. Pt also had DVT. He is on Eliquis for this. Dr. Mazariegos stopped Zeposia and it has not been resumed yet. He started having diarrhea a few days ago and has seen a little blood in the stool. No abd pain. Last dose of Zeposia was before hospitalization on 2/18 or 2/17. He got IV abx in hospital which seemed to help his UC. H/o UC for 15 years. Son also has UC.  No fever recently. He is not sure if he has had C. diff before. Pt took steroids before starting Zeposia. Pt is having 2-3 loose or watery stools a day. Stools were more formed on Zeposia. It was started last Fall. Hospital records reviewed: CT a/p with mild thickening in ascending colon. GI PCR & C. diff negative.

## 2024-03-29 LAB — C-REACTIVE PROTEIN, QUANT: 8.5

## 2024-04-29 ENCOUNTER — OV EP (OUTPATIENT)
Dept: URBAN - METROPOLITAN AREA CLINIC 111 | Facility: CLINIC | Age: 84
End: 2024-04-29
Payer: MEDICARE

## 2024-04-29 VITALS
HEIGHT: 69 IN | SYSTOLIC BLOOD PRESSURE: 128 MMHG | TEMPERATURE: 98.1 F | DIASTOLIC BLOOD PRESSURE: 71 MMHG | HEART RATE: 77 BPM | BODY MASS INDEX: 25.95 KG/M2 | WEIGHT: 175.2 LBS

## 2024-04-29 DIAGNOSIS — K51.90 ULCERATIVE COLITIS: ICD-10-CM

## 2024-04-29 PROCEDURE — 99213 OFFICE O/P EST LOW 20 MIN: CPT | Performed by: PHYSICIAN ASSISTANT

## 2024-04-29 RX ORDER — OZANIMOD HYDROCHLORIDE 0.92 MG/1
1 CAPSULE CAPSULE ORAL ONCE A DAY
Qty: 90 CAPSULE | Refills: 3 | Status: ACTIVE | COMMUNITY

## 2024-04-29 NOTE — HPI-TODAY'S VISIT:
82 y/o male with ulcerative colitis here to follow up. Seen by me on 3/28 for hospital follow up. Pt was told to resume Zeposia. He had stool studies and inflammatory markers checked. Negative stool studies. Calprotectin was very high. CRP was also up. Pt was put on steroids for flare. He was having diarrhea and a little blood in the stool when seen at last visit.   Pt is here with his son again. He has been taking Zeposia and the Prednisone. He is on the last few days of Prednisone. He was doing better but then started seeing blood again last week. It has resolved again. He has not seen blood in 4 days.  No diarrhea. He feels well and has good energy. He has been on Zeposia since 9/2023.  Previous: He had been doing well on Zeposia. Pt had labs ordered when seen by NP in January. Labs okay. Calprotectin and CRP WNL. Calpro previously high. H/o iron def anemia and had stopped iron. Iron WNL in Feb. Ferritin and Hgb mildly low. Colonoscopy in 2/2021 with another GI revealed lymphocytic colitis in right colon and active colitis in left. Previously took Remicade & Humira. Pt also has h/o metastatic prostate cancer. EGD in 2/2023 for PEG removal.   Pt had stopped Zeposia d/t E. coli bacteremia and DVT. He is on Eliquis for this. Dr. Mazariegos had stopped Zeposia. Last dose of Zeposia was before hospitalization on 2/18 or 2/17. He got IV abx in hospital which seemed to help his UC. H/o UC for 15 years. Son also has UC. Hospital records reviewed: CT a/p with mild thickening in ascending colon. GI PCR & C. diff negative.

## 2024-08-20 ENCOUNTER — CLAIMS CREATED FROM THE CLAIM WINDOW (OUTPATIENT)
Dept: URBAN - METROPOLITAN AREA MEDICAL CENTER 27 | Facility: MEDICAL CENTER | Age: 84
End: 2024-08-20
Payer: MEDICARE

## 2024-08-20 DIAGNOSIS — R19.7 ACUTE DIARRHEA: ICD-10-CM

## 2024-08-20 DIAGNOSIS — Z79.01 ADEQUATE ANTICOAGULATION ON ANTICOAGULANT THERAPY: ICD-10-CM

## 2024-08-20 DIAGNOSIS — R11.2 ACUTE NAUSEA WITH NONBILIOUS VOMITING: ICD-10-CM

## 2024-08-20 DIAGNOSIS — K51.018 CHRONIC ULCERATIVE ENTEROCOLITIS WITH OTHER COMPLICATION: ICD-10-CM

## 2024-08-20 PROCEDURE — 99254 IP/OBS CNSLTJ NEW/EST MOD 60: CPT

## 2024-08-20 PROCEDURE — G8427 DOCREV CUR MEDS BY ELIG CLIN: HCPCS

## 2024-08-20 PROCEDURE — 99222 1ST HOSP IP/OBS MODERATE 55: CPT

## 2024-08-21 ENCOUNTER — CLAIMS CREATED FROM THE CLAIM WINDOW (OUTPATIENT)
Dept: URBAN - METROPOLITAN AREA MEDICAL CENTER 27 | Facility: MEDICAL CENTER | Age: 84
End: 2024-08-21

## 2024-08-21 PROCEDURE — 99232 SBSQ HOSP IP/OBS MODERATE 35: CPT

## 2024-08-22 ENCOUNTER — CLAIMS CREATED FROM THE CLAIM WINDOW (OUTPATIENT)
Dept: URBAN - METROPOLITAN AREA MEDICAL CENTER 27 | Facility: MEDICAL CENTER | Age: 84
End: 2024-08-22

## 2024-08-22 PROCEDURE — 99232 SBSQ HOSP IP/OBS MODERATE 35: CPT | Performed by: PHYSICIAN ASSISTANT

## 2024-08-23 ENCOUNTER — CLAIMS CREATED FROM THE CLAIM WINDOW (OUTPATIENT)
Dept: URBAN - METROPOLITAN AREA MEDICAL CENTER 27 | Facility: MEDICAL CENTER | Age: 84
End: 2024-08-23

## 2024-08-23 PROCEDURE — 45380 COLONOSCOPY AND BIOPSY: CPT | Performed by: INTERNAL MEDICINE

## 2024-08-28 ENCOUNTER — OFFICE VISIT (OUTPATIENT)
Dept: URBAN - METROPOLITAN AREA CLINIC 111 | Facility: CLINIC | Age: 84
End: 2024-08-28

## 2024-08-29 ENCOUNTER — OFFICE VISIT (OUTPATIENT)
Dept: URBAN - METROPOLITAN AREA CLINIC 12 | Facility: CLINIC | Age: 84
End: 2024-08-29
Payer: MEDICARE

## 2024-08-29 ENCOUNTER — TELEPHONE ENCOUNTER (OUTPATIENT)
Dept: URBAN - METROPOLITAN AREA CLINIC 23 | Facility: CLINIC | Age: 84
End: 2024-08-29

## 2024-08-29 ENCOUNTER — DASHBOARD ENCOUNTERS (OUTPATIENT)
Age: 84
End: 2024-08-29

## 2024-08-29 VITALS
SYSTOLIC BLOOD PRESSURE: 125 MMHG | DIASTOLIC BLOOD PRESSURE: 72 MMHG | BODY MASS INDEX: 25.24 KG/M2 | WEIGHT: 170.4 LBS | TEMPERATURE: 98.4 F | HEART RATE: 78 BPM | HEIGHT: 69 IN

## 2024-08-29 DIAGNOSIS — K51.90 ULCERATIVE COLITIS: ICD-10-CM

## 2024-08-29 PROCEDURE — 99214 OFFICE O/P EST MOD 30 MIN: CPT | Performed by: INTERNAL MEDICINE

## 2024-08-29 RX ORDER — INFLIXIMAB 100 MG/10ML
AS DIRECTED INJECTION, POWDER, LYOPHILIZED, FOR SOLUTION INTRAVENOUS
OUTPATIENT
Start: 2024-08-30

## 2024-08-29 RX ORDER — PREDNISONE 10 MG/1
2 TABLETS WITH FOOD OR MILK TABLET ORAL ONCE A DAY
Qty: 60 TABLET | Refills: 3 | OUTPATIENT
Start: 2024-08-29 | End: 2024-12-26

## 2024-08-29 RX ORDER — ACETAMINOPHEN 650 MG
2 TABLETS AS NEEDED TABLET, EXTENDED RELEASE ORAL
Qty: 6 | Refills: 0 | OUTPATIENT
Start: 2024-08-30 | End: 2024-08-31

## 2024-08-29 RX ORDER — OZANIMOD HYDROCHLORIDE 0.92 MG/1
1 CAPSULE CAPSULE ORAL ONCE A DAY
Qty: 90 CAPSULE | Refills: 3 | Status: ACTIVE | COMMUNITY

## 2024-08-29 NOTE — HPI-TODAY'S VISIT:
85 y/o male with ulcerative colitis presents for follow up after recent hospitalization August 24, 2024 for severe flare of his colitis.  Patient was admitted for diarrhea hematochezia abdominal pain he has history of DVT on Eliquis CT showed pancolitis stool studies showed calprotectin more than 3000 he had a colonoscopy during the hospitalization which revealed diffuse active disease biopsy active colitis no infection.  He started on steroids 60 mg a day is here today for follow-up he was on Zeposia daily but had 2 flares while on it.  Had trial in the past of Remicade which had good response then switched to Humira to avoid infusions he did not respond to Humira he did not respond to it since switched to oral supposed yeah he responded the beginning to the medication and now had 2 flares over the last 6 months.  Currently on steroid 60 mg a day  Since discharge she feels better   - No bleeding   - Eating ok, no problems with food   - BMs: ~3/day (on high dose prednisone)

## 2024-09-05 ENCOUNTER — TELEPHONE ENCOUNTER (OUTPATIENT)
Dept: URBAN - METROPOLITAN AREA CLINIC 23 | Facility: CLINIC | Age: 84
End: 2024-09-05

## 2024-09-09 ENCOUNTER — TELEPHONE ENCOUNTER (OUTPATIENT)
Dept: URBAN - METROPOLITAN AREA CLINIC 23 | Facility: CLINIC | Age: 84
End: 2024-09-09

## 2024-09-10 ENCOUNTER — TELEPHONE ENCOUNTER (OUTPATIENT)
Dept: URBAN - METROPOLITAN AREA CLINIC 23 | Facility: CLINIC | Age: 84
End: 2024-09-10

## 2024-09-11 ENCOUNTER — TELEPHONE ENCOUNTER (OUTPATIENT)
Dept: URBAN - METROPOLITAN AREA CLINIC 23 | Facility: CLINIC | Age: 84
End: 2024-09-11

## 2024-09-16 ENCOUNTER — TELEPHONE ENCOUNTER (OUTPATIENT)
Dept: URBAN - METROPOLITAN AREA CLINIC 23 | Facility: CLINIC | Age: 84
End: 2024-09-16

## 2024-09-20 ENCOUNTER — OFFICE VISIT (OUTPATIENT)
Dept: URBAN - METROPOLITAN AREA CLINIC 23 | Facility: CLINIC | Age: 84
End: 2024-09-20

## 2024-09-26 ENCOUNTER — TELEPHONE ENCOUNTER (OUTPATIENT)
Dept: URBAN - METROPOLITAN AREA CLINIC 23 | Facility: CLINIC | Age: 84
End: 2024-09-26

## 2024-10-08 ENCOUNTER — OFFICE VISIT (OUTPATIENT)
Dept: URBAN - METROPOLITAN AREA CLINIC 23 | Facility: CLINIC | Age: 84
End: 2024-10-08
Payer: MEDICARE

## 2024-10-08 ENCOUNTER — OFFICE VISIT (OUTPATIENT)
Dept: URBAN - METROPOLITAN AREA CLINIC 111 | Facility: CLINIC | Age: 84
End: 2024-10-08

## 2024-10-08 VITALS
SYSTOLIC BLOOD PRESSURE: 140 MMHG | TEMPERATURE: 98.2 F | WEIGHT: 173 LBS | HEART RATE: 88 BPM | HEIGHT: 69 IN | DIASTOLIC BLOOD PRESSURE: 92 MMHG | BODY MASS INDEX: 25.62 KG/M2

## 2024-10-08 DIAGNOSIS — R19.7 DIARRHEA: ICD-10-CM

## 2024-10-08 DIAGNOSIS — I48.91 AFIB: ICD-10-CM

## 2024-10-08 DIAGNOSIS — K51.90 ULCERATIVE COLITIS: ICD-10-CM

## 2024-10-08 PROCEDURE — 99214 OFFICE O/P EST MOD 30 MIN: CPT | Performed by: INTERNAL MEDICINE

## 2024-10-08 RX ORDER — PREDNISONE 10 MG/1
2 TABLETS WITH FOOD OR MILK TABLET ORAL ONCE A DAY
Qty: 60 TABLET | Refills: 3 | Status: ACTIVE | COMMUNITY
Start: 2024-08-29 | End: 2024-12-26

## 2024-10-08 RX ORDER — INFLIXIMAB 100 MG/10ML
AS DIRECTED INJECTION, POWDER, LYOPHILIZED, FOR SOLUTION INTRAVENOUS
OUTPATIENT

## 2024-10-08 RX ORDER — PREDNISONE 5 MG/1
2 TABLET ONCE A DAY FOR WEEK THEN 5 MG A DAY FOR ONE WEEK THEN STOP TABLET ORAL ONCE A DAY
Qty: 30 | Refills: 1 | OUTPATIENT
Start: 2024-10-08 | End: 2024-12-06

## 2024-10-08 RX ORDER — OZANIMOD HYDROCHLORIDE 0.92 MG/1
1 CAPSULE CAPSULE ORAL ONCE A DAY
Qty: 90 CAPSULE | Refills: 3 | Status: ACTIVE | COMMUNITY

## 2024-10-08 RX ORDER — INFLIXIMAB 100 MG/10ML
AS DIRECTED INJECTION, POWDER, LYOPHILIZED, FOR SOLUTION INTRAVENOUS
Status: ACTIVE | COMMUNITY
Start: 2024-08-30

## 2024-10-08 NOTE — HPI-TODAY'S VISIT:
83 y/o male with ulcerative colitis presents for follow up.  He had severe flare of His ulcerative colitis  hospitalization in August 2024 was scheduled to have Remicade but because positive TB QuantiFERON he was seen by ID had TB skin test which was negative cleared to proceed with the infusion is scheduled for the infusion tomorrow report his diarrhea is better currently on prednisone 20 mg a day he gained weight since then  r recent hospitalization August 24, 2024 for severe flare of his colitis.  Patient was admitted for diarrhea hematochezia abdominal pain he has history of DVT on Eliquis CT showed pancolitis stool studies showed calprotectin more than 3000 he had a colonoscopy during the hospitalization which revealed diffuse active disease biopsy active colitis no infection.  Had trial in the past of Remicade which had good response then switched to Humira to avoid infusions he did not respond to Humira he did not respond to it since switched to oral supposed yeah he responded the beginning to the medication and now had 2 flares over the last 6 months.    - No bleeding   - Eating ok, no problems with food   - BMs: ~3/day (on high dose prednison - Scheduled for first dose of Remicade tomorrow  - Diarrhea: improved  - Currently on prednisone 20mg daily (10mg BID)

## 2024-10-09 ENCOUNTER — OFFICE VISIT (OUTPATIENT)
Dept: URBAN - METROPOLITAN AREA CLINIC 114 | Facility: CLINIC | Age: 84
End: 2024-10-09
Payer: MEDICARE

## 2024-10-09 VITALS
HEIGHT: 69 IN | TEMPERATURE: 98.1 F | SYSTOLIC BLOOD PRESSURE: 135 MMHG | DIASTOLIC BLOOD PRESSURE: 87 MMHG | HEART RATE: 87 BPM | BODY MASS INDEX: 26.07 KG/M2 | WEIGHT: 176 LBS | RESPIRATION RATE: 20 BRPM

## 2024-10-09 DIAGNOSIS — K51.90 ULCERATIVE COLITIS: ICD-10-CM

## 2024-10-09 PROCEDURE — 96413 CHEMO IV INFUSION 1 HR: CPT | Performed by: INTERNAL MEDICINE

## 2024-10-09 PROCEDURE — 96415 CHEMO IV INFUSION ADDL HR: CPT | Performed by: INTERNAL MEDICINE

## 2024-10-09 RX ORDER — INFLIXIMAB 100 MG/10ML
AS DIRECTED INJECTION, POWDER, LYOPHILIZED, FOR SOLUTION INTRAVENOUS
Status: ACTIVE | COMMUNITY

## 2024-10-09 RX ORDER — OZANIMOD HYDROCHLORIDE 0.92 MG/1
1 CAPSULE CAPSULE ORAL ONCE A DAY
Qty: 90 CAPSULE | Refills: 3 | Status: ACTIVE | COMMUNITY

## 2024-10-09 RX ORDER — PREDNISONE 5 MG/1
2 TABLET ONCE A DAY FOR WEEK THEN 5 MG A DAY FOR ONE WEEK THEN STOP TABLET ORAL ONCE A DAY
Qty: 30 | Refills: 1 | Status: ACTIVE | COMMUNITY
Start: 2024-10-08 | End: 2024-12-06

## 2024-10-09 RX ORDER — PREDNISONE 10 MG/1
2 TABLETS WITH FOOD OR MILK TABLET ORAL ONCE A DAY
Qty: 60 TABLET | Refills: 3 | Status: ACTIVE | COMMUNITY
Start: 2024-08-29 | End: 2024-12-26

## 2024-10-21 ENCOUNTER — OFFICE VISIT (OUTPATIENT)
Dept: URBAN - METROPOLITAN AREA CLINIC 77 | Facility: CLINIC | Age: 84
End: 2024-10-21
Payer: MEDICARE

## 2024-10-21 VITALS
BODY MASS INDEX: 26.07 KG/M2 | WEIGHT: 176 LBS | SYSTOLIC BLOOD PRESSURE: 123 MMHG | TEMPERATURE: 98.3 F | HEIGHT: 69 IN | DIASTOLIC BLOOD PRESSURE: 72 MMHG

## 2024-10-21 DIAGNOSIS — K51.90 ULCERATIVE COLITIS: ICD-10-CM

## 2024-10-21 PROCEDURE — 96415 CHEMO IV INFUSION ADDL HR: CPT | Performed by: INTERNAL MEDICINE

## 2024-10-21 PROCEDURE — 96413 CHEMO IV INFUSION 1 HR: CPT | Performed by: INTERNAL MEDICINE

## 2024-10-21 RX ORDER — INFLIXIMAB 100 MG/10ML
AS DIRECTED INJECTION, POWDER, LYOPHILIZED, FOR SOLUTION INTRAVENOUS
Status: ACTIVE | COMMUNITY

## 2024-10-21 RX ORDER — PREDNISONE 5 MG/1
2 TABLET ONCE A DAY FOR WEEK THEN 5 MG A DAY FOR ONE WEEK THEN STOP TABLET ORAL ONCE A DAY
Qty: 30 | Refills: 1 | Status: ACTIVE | COMMUNITY
Start: 2024-10-08 | End: 2024-12-06

## 2024-10-21 RX ORDER — PREDNISONE 10 MG/1
2 TABLETS WITH FOOD OR MILK TABLET ORAL ONCE A DAY
Qty: 60 TABLET | Refills: 3 | Status: ACTIVE | COMMUNITY
Start: 2024-08-29 | End: 2024-12-26

## 2024-10-21 RX ORDER — OZANIMOD HYDROCHLORIDE 0.92 MG/1
1 CAPSULE CAPSULE ORAL ONCE A DAY
Qty: 90 CAPSULE | Refills: 3 | Status: ACTIVE | COMMUNITY

## 2024-10-29 ENCOUNTER — OFFICE VISIT (OUTPATIENT)
Dept: URBAN - METROPOLITAN AREA CLINIC 111 | Facility: CLINIC | Age: 84
End: 2024-10-29

## 2024-11-12 ENCOUNTER — TELEPHONE ENCOUNTER (OUTPATIENT)
Dept: URBAN - METROPOLITAN AREA CLINIC 23 | Facility: CLINIC | Age: 84
End: 2024-11-12

## 2024-11-12 ENCOUNTER — OFFICE VISIT (OUTPATIENT)
Dept: URBAN - METROPOLITAN AREA CLINIC 23 | Facility: CLINIC | Age: 84
End: 2024-11-12

## 2024-11-18 ENCOUNTER — OFFICE VISIT (OUTPATIENT)
Dept: URBAN - METROPOLITAN AREA CLINIC 77 | Facility: CLINIC | Age: 84
End: 2024-11-18
Payer: MEDICARE

## 2024-11-18 VITALS
DIASTOLIC BLOOD PRESSURE: 73 MMHG | WEIGHT: 176 LBS | BODY MASS INDEX: 26.07 KG/M2 | TEMPERATURE: 98.5 F | SYSTOLIC BLOOD PRESSURE: 128 MMHG | HEIGHT: 69 IN

## 2024-11-18 DIAGNOSIS — K51.90 ULCERATIVE COLITIS: ICD-10-CM

## 2024-11-18 PROCEDURE — 96413 CHEMO IV INFUSION 1 HR: CPT | Performed by: INTERNAL MEDICINE

## 2024-11-18 PROCEDURE — 96415 CHEMO IV INFUSION ADDL HR: CPT | Performed by: INTERNAL MEDICINE

## 2024-11-18 RX ORDER — INFLIXIMAB 100 MG/10ML
AS DIRECTED INJECTION, POWDER, LYOPHILIZED, FOR SOLUTION INTRAVENOUS
Status: ACTIVE | COMMUNITY

## 2024-11-18 RX ORDER — OZANIMOD HYDROCHLORIDE 0.92 MG/1
1 CAPSULE CAPSULE ORAL ONCE A DAY
Qty: 90 CAPSULE | Refills: 3 | Status: ACTIVE | COMMUNITY

## 2024-11-18 RX ORDER — PREDNISONE 10 MG/1
2 TABLETS WITH FOOD OR MILK TABLET ORAL ONCE A DAY
Qty: 60 TABLET | Refills: 3 | Status: ACTIVE | COMMUNITY
Start: 2024-08-29 | End: 2024-12-26

## 2024-11-18 RX ORDER — PREDNISONE 5 MG/1
2 TABLET ONCE A DAY FOR WEEK THEN 5 MG A DAY FOR ONE WEEK THEN STOP TABLET ORAL ONCE A DAY
Qty: 30 | Refills: 1 | Status: ACTIVE | COMMUNITY
Start: 2024-10-08 | End: 2024-12-06

## 2024-11-20 ENCOUNTER — OFFICE VISIT (OUTPATIENT)
Dept: URBAN - METROPOLITAN AREA CLINIC 23 | Facility: CLINIC | Age: 84
End: 2024-11-20
Payer: MEDICARE

## 2024-11-20 VITALS
SYSTOLIC BLOOD PRESSURE: 113 MMHG | TEMPERATURE: 98.2 F | HEART RATE: 74 BPM | HEIGHT: 69 IN | DIASTOLIC BLOOD PRESSURE: 77 MMHG | BODY MASS INDEX: 25.92 KG/M2 | WEIGHT: 175 LBS

## 2024-11-20 DIAGNOSIS — K51.90 ULCERATIVE COLITIS: ICD-10-CM

## 2024-11-20 PROCEDURE — 99213 OFFICE O/P EST LOW 20 MIN: CPT

## 2024-11-20 RX ORDER — PREDNISONE 5 MG/1
2 TABLET ONCE A DAY FOR WEEK THEN 5 MG A DAY FOR ONE WEEK THEN STOP TABLET ORAL ONCE A DAY
Qty: 30 | Refills: 1 | Status: ACTIVE | COMMUNITY
Start: 2024-10-08 | End: 2024-12-06

## 2024-11-20 RX ORDER — INFLIXIMAB 100 MG/10ML
AS DIRECTED INJECTION, POWDER, LYOPHILIZED, FOR SOLUTION INTRAVENOUS
Status: ACTIVE | COMMUNITY

## 2024-11-20 RX ORDER — PREDNISONE 10 MG/1
2 TABLETS WITH FOOD OR MILK TABLET ORAL ONCE A DAY
Qty: 60 TABLET | Refills: 3 | Status: ACTIVE | COMMUNITY
Start: 2024-08-29 | End: 2024-12-26

## 2024-11-20 RX ORDER — OZANIMOD HYDROCHLORIDE 0.92 MG/1
1 CAPSULE CAPSULE ORAL ONCE A DAY
Qty: 90 CAPSULE | Refills: 3 | Status: ACTIVE | COMMUNITY

## 2024-11-20 NOTE — HPI-TODAY'S VISIT:
84-year-old male with ulcerative colitis here for blood in stool.  He was last seen in clinic October 8 by Dr. Mazariegos for follow-up after recent hospitalization in August for severe flare of ulcerative colitis.  He was admitted for diarrhea, hematochezia, abdominal pain.  History of DVT and on Eliquis.  CT showed pancolitis.  Stool studies showed calprotectin more than 3000.  Colonoscopy during hospitalization revealed diffuse active disease, biopsy showed active colitis.  No infection.  He had trial of Remicade in the past and good response then switched to Humira to avoid infusions.  He did not respond to Humira and had 2 flares over the last 6 months.  Since last visit, he did have a syncopal episode and fell on the kitchen floor.  He is in 4 days in ICU with brain bleed.   Today, he is here with his son.  He has been off of OAC since discharge. Plans to see cardiology and neurology soon due to syncopal episodes.  Doing well on remicaide. Next infusion is in January.  Reports 1-2 BM daily No abd pain or bloody stool.

## 2025-01-06 ENCOUNTER — OFFICE VISIT (OUTPATIENT)
Dept: URBAN - METROPOLITAN AREA CLINIC 77 | Facility: CLINIC | Age: 85
End: 2025-01-06

## 2025-01-13 ENCOUNTER — OFFICE VISIT (OUTPATIENT)
Dept: URBAN - METROPOLITAN AREA CLINIC 77 | Facility: CLINIC | Age: 85
End: 2025-01-13
Payer: MEDICARE

## 2025-01-13 VITALS
SYSTOLIC BLOOD PRESSURE: 111 MMHG | DIASTOLIC BLOOD PRESSURE: 49 MMHG | BODY MASS INDEX: 26.28 KG/M2 | TEMPERATURE: 98 F | WEIGHT: 177.4 LBS | HEIGHT: 69 IN

## 2025-01-13 DIAGNOSIS — K51.90 ULCERATIVE COLITIS: ICD-10-CM

## 2025-01-13 PROCEDURE — 96413 CHEMO IV INFUSION 1 HR: CPT | Performed by: INTERNAL MEDICINE

## 2025-01-13 PROCEDURE — 96415 CHEMO IV INFUSION ADDL HR: CPT | Performed by: INTERNAL MEDICINE

## 2025-01-13 RX ORDER — OZANIMOD HYDROCHLORIDE 0.92 MG/1
1 CAPSULE CAPSULE ORAL ONCE A DAY
Qty: 90 CAPSULE | Refills: 3 | Status: ACTIVE | COMMUNITY

## 2025-01-13 RX ORDER — INFLIXIMAB 100 MG/10ML
AS DIRECTED INJECTION, POWDER, LYOPHILIZED, FOR SOLUTION INTRAVENOUS
Status: ACTIVE | COMMUNITY

## 2025-03-14 ENCOUNTER — OFFICE VISIT (OUTPATIENT)
Dept: URBAN - METROPOLITAN AREA CLINIC 77 | Facility: CLINIC | Age: 85
End: 2025-03-14
Payer: MEDICARE

## 2025-03-14 ENCOUNTER — OFFICE VISIT (OUTPATIENT)
Dept: URBAN - METROPOLITAN AREA CLINIC 77 | Facility: CLINIC | Age: 85
End: 2025-03-14

## 2025-03-14 VITALS
RESPIRATION RATE: 18 BRPM | DIASTOLIC BLOOD PRESSURE: 67 MMHG | TEMPERATURE: 98.2 F | HEIGHT: 69 IN | WEIGHT: 180.4 LBS | SYSTOLIC BLOOD PRESSURE: 103 MMHG | BODY MASS INDEX: 26.72 KG/M2

## 2025-03-14 DIAGNOSIS — K51.90 ULCERATIVE COLITIS: ICD-10-CM

## 2025-03-14 PROCEDURE — 96413 CHEMO IV INFUSION 1 HR: CPT | Performed by: INTERNAL MEDICINE

## 2025-03-14 PROCEDURE — 96415 CHEMO IV INFUSION ADDL HR: CPT | Performed by: INTERNAL MEDICINE

## 2025-03-14 RX ORDER — OZANIMOD HYDROCHLORIDE 0.92 MG/1
1 CAPSULE CAPSULE ORAL ONCE A DAY
Qty: 90 CAPSULE | Refills: 3 | Status: ACTIVE | COMMUNITY

## 2025-03-14 RX ORDER — INFLIXIMAB 100 MG/10ML
AS DIRECTED INJECTION, POWDER, LYOPHILIZED, FOR SOLUTION INTRAVENOUS
Status: ACTIVE | COMMUNITY

## 2025-04-30 ENCOUNTER — TELEPHONE ENCOUNTER (OUTPATIENT)
Dept: URBAN - METROPOLITAN AREA CLINIC 23 | Facility: CLINIC | Age: 85
End: 2025-04-30

## 2025-05-02 ENCOUNTER — OFFICE VISIT (OUTPATIENT)
Dept: URBAN - METROPOLITAN AREA CLINIC 23 | Facility: CLINIC | Age: 85
End: 2025-05-02
Payer: MEDICARE

## 2025-05-02 DIAGNOSIS — K51.90 ULCERATIVE COLITIS: ICD-10-CM

## 2025-05-02 DIAGNOSIS — R19.5 LOOSE STOOLS: ICD-10-CM

## 2025-05-02 PROCEDURE — 99214 OFFICE O/P EST MOD 30 MIN: CPT

## 2025-05-02 RX ORDER — INFLIXIMAB 100 MG/10ML
AS DIRECTED INJECTION, POWDER, LYOPHILIZED, FOR SOLUTION INTRAVENOUS
Status: ACTIVE | COMMUNITY

## 2025-05-02 NOTE — HPI-TODAY'S VISIT:
84-year-old male here to discuss increasing frequency of Remicade.  He was last seen in clinic November 2024 by me for ulcerative colitis.  He was on Remicade infusions every 8 weeks. 1-2 bowel movements daily with no abdominal pain or bloody stool.  His son called to ask if Remicade can be done every 4 to 6-week instead of every 8 weeks as he is having some blood in stool and diarrhea still.   Today, he complains of 2-3 loose BMs daily. [place] 5-7. He reports episdoes of rectal bleeding. No abd pain.  His last infusion: March 14 Next infusion: May 16 Previous therapies: failed Humira and zeposia Prior hospitalization October 2024 for severe flare of UC.  Admitted for diarrhea, bleeding and abdominal pain.  CT with pancolitis.  Fecal calprotectin more than 3000.  Colonoscopy revealed diffuse active disease.  Biopsy with active colitis.

## 2025-05-04 ENCOUNTER — LAB OUTSIDE AN ENCOUNTER (OUTPATIENT)
Dept: URBAN - METROPOLITAN AREA CLINIC 23 | Facility: CLINIC | Age: 85
End: 2025-05-04

## 2025-05-09 LAB
ADENOVIRUS F 40/41: NOT DETECTED
C. DIFFICILE TOXIN A/B, STOOL - QDX: NEGATIVE
CALPROTECTIN, STOOL - QDX: (no result)
CAMPYLOBACTER: NOT DETECTED
CLOSTRIDIUM DIFFICILE: NOT DETECTED
ENTAMOEBA HISTOLYTICA: NOT DETECTED
ENTEROAGGREGATIVE E.COLI: NOT DETECTED
ENTEROTOXIGENIC E.COLI: NOT DETECTED
ESCHERICHIA COLI O157: NOT DETECTED
GIARDIA LAMBLIA: NOT DETECTED
NOROVIRUS GI/GII: NOT DETECTED
ROTAVIRUS A: NOT DETECTED
SALMONELLA SPP.: NOT DETECTED
SHIGA-LIKE TOXIN PRODUCING E.COLI: NOT DETECTED
SHIGELLA SPP. / ENTEROINVASIVE E.COLI: NOT DETECTED
VIBRIO PARAHAEMOLYTICUS: NOT DETECTED
VIBRIO SPP.: NOT DETECTED
YERSINIA ENTEROCOLITICA: NOT DETECTED

## 2025-05-13 ENCOUNTER — TELEPHONE ENCOUNTER (OUTPATIENT)
Dept: URBAN - METROPOLITAN AREA CLINIC 23 | Facility: CLINIC | Age: 85
End: 2025-05-13

## 2025-05-15 ENCOUNTER — TELEPHONE ENCOUNTER (OUTPATIENT)
Dept: URBAN - METROPOLITAN AREA CLINIC 23 | Facility: CLINIC | Age: 85
End: 2025-05-15

## 2025-05-16 ENCOUNTER — OFFICE VISIT (OUTPATIENT)
Dept: URBAN - METROPOLITAN AREA CLINIC 77 | Facility: CLINIC | Age: 85
End: 2025-05-16
Payer: MEDICARE

## 2025-05-16 ENCOUNTER — TELEPHONE ENCOUNTER (OUTPATIENT)
Dept: URBAN - METROPOLITAN AREA CLINIC 23 | Facility: CLINIC | Age: 85
End: 2025-05-16

## 2025-05-16 ENCOUNTER — CLAIMS CREATED FROM THE CLAIM WINDOW (OUTPATIENT)
Dept: URBAN - METROPOLITAN AREA MEDICAL CENTER 27 | Facility: MEDICAL CENTER | Age: 85
End: 2025-05-16

## 2025-05-16 DIAGNOSIS — K51.90 ACUTE ULCERATIVE COLITIS: ICD-10-CM

## 2025-05-16 PROCEDURE — 99254 IP/OBS CNSLTJ NEW/EST MOD 60: CPT

## 2025-05-16 PROCEDURE — 96413 CHEMO IV INFUSION 1 HR: CPT | Performed by: INTERNAL MEDICINE

## 2025-05-16 PROCEDURE — 96375 TX/PRO/DX INJ NEW DRUG ADDON: CPT | Performed by: INTERNAL MEDICINE

## 2025-05-16 RX ORDER — INFLIXIMAB 100 MG/10ML
AS DIRECTED INJECTION, POWDER, LYOPHILIZED, FOR SOLUTION INTRAVENOUS
Status: ACTIVE | COMMUNITY

## 2025-05-17 ENCOUNTER — CLAIMS CREATED FROM THE CLAIM WINDOW (OUTPATIENT)
Dept: URBAN - METROPOLITAN AREA MEDICAL CENTER 27 | Facility: MEDICAL CENTER | Age: 85
End: 2025-05-17

## 2025-05-17 PROCEDURE — 99232 SBSQ HOSP IP/OBS MODERATE 35: CPT | Performed by: STUDENT IN AN ORGANIZED HEALTH CARE EDUCATION/TRAINING PROGRAM

## 2025-05-19 ENCOUNTER — CLAIMS CREATED FROM THE CLAIM WINDOW (OUTPATIENT)
Dept: URBAN - METROPOLITAN AREA MEDICAL CENTER 27 | Facility: MEDICAL CENTER | Age: 85
End: 2025-05-19

## 2025-05-19 PROCEDURE — 99232 SBSQ HOSP IP/OBS MODERATE 35: CPT

## 2025-05-20 ENCOUNTER — OFFICE VISIT (OUTPATIENT)
Dept: URBAN - METROPOLITAN AREA CLINIC 23 | Facility: CLINIC | Age: 85
End: 2025-05-20

## 2025-05-20 LAB
ABSOLUTE BASOPHILS: 50
ABSOLUTE EOSINOPHILS: 525
ABSOLUTE LYMPHOCYTES: 1554
ABSOLUTE MONOCYTES: 1059
ABSOLUTE NEUTROPHILS: 6712
BASOPHILS: 0.5
C-REACTIVE PROTEIN, QUANT: <3
COMMENT: (no result)
EOSINOPHILS: 5.3
HEMATOCRIT: 37.2
HEMOGLOBIN: 12.3
INFLIXIMAB AB, IBD: 58
INFLIXIMAB LEVEL, IBD: 1.9
INTERPRETATION: (no result)
LYMPHOCYTES: 15.7
MCH: 32.4
MCHC: 33.1
MCV: 97.9
MONOCYTES: 10.7
MPV: 11.3
NEUTROPHILS: 67.8
PLATELET COUNT: 300
RDW: 12.4
RED BLOOD CELL COUNT: 3.8
WHITE BLOOD CELL COUNT: 9.9

## 2025-05-22 ENCOUNTER — TELEPHONE ENCOUNTER (OUTPATIENT)
Dept: URBAN - METROPOLITAN AREA CLINIC 23 | Facility: CLINIC | Age: 85
End: 2025-05-22

## 2025-05-22 ENCOUNTER — TELEPHONE ENCOUNTER (OUTPATIENT)
Dept: URBAN - METROPOLITAN AREA CLINIC 77 | Facility: CLINIC | Age: 85
End: 2025-05-22

## 2025-05-23 ENCOUNTER — TELEPHONE ENCOUNTER (OUTPATIENT)
Dept: URBAN - METROPOLITAN AREA CLINIC 23 | Facility: CLINIC | Age: 85
End: 2025-05-23

## 2025-05-27 ENCOUNTER — TELEPHONE ENCOUNTER (OUTPATIENT)
Dept: URBAN - METROPOLITAN AREA CLINIC 23 | Facility: CLINIC | Age: 85
End: 2025-05-27

## 2025-05-29 ENCOUNTER — OFFICE VISIT (OUTPATIENT)
Dept: URBAN - METROPOLITAN AREA CLINIC 23 | Facility: CLINIC | Age: 85
End: 2025-05-29
Payer: MEDICARE

## 2025-05-29 DIAGNOSIS — K51.90 ULCERATIVE COLITIS: ICD-10-CM

## 2025-05-29 PROCEDURE — 99214 OFFICE O/P EST MOD 30 MIN: CPT

## 2025-05-29 RX ORDER — INFLIXIMAB 100 MG/10ML
AS DIRECTED INJECTION, POWDER, LYOPHILIZED, FOR SOLUTION INTRAVENOUS
Status: ACTIVE | COMMUNITY

## 2025-05-29 RX ORDER — RISANKIZUMAB-RZAA 60 MG/ML
AS DIRECTED INJECTION INTRAVENOUS
Qty: 1200 | Refills: 0 | OUTPATIENT
Start: 2025-05-29 | End: 2025-05-29

## 2025-05-29 NOTE — HPI-TODAY'S VISIT:
84-year-old male here for follow-up after hospitalization.  He was last seen by me in clinic 05/02/2025 due to rectal bleeding, 2-3 loose bowel movements daily.  [place] 5-7.  He was interested in increasing frequency of Remicade.  He is on Remicade every 8 weeks.  More recently, he experienced syncopal episode during infusion and was taken to [facility].  Patient diagnosed with pneumonia.  GI consulted and recommendholding off on inpatient infliximab due to active infection.  Follow-up outpatient.  Today, he states he saw ID on Tuesday. Has 1 more day of outpatient antibiotics. He has not had repeat labs since he left the hospital. He admits to rectal bleeding. Still having loose stool 3-4 times per day. No abd pain.

## 2025-06-03 ENCOUNTER — TELEPHONE ENCOUNTER (OUTPATIENT)
Dept: URBAN - METROPOLITAN AREA CLINIC 23 | Facility: CLINIC | Age: 85
End: 2025-06-03

## 2025-06-03 RX ORDER — PREDNISONE 10 MG/1
TAKE 4 TABS PO X 1 WEEK, TAKE 3 TABS PO X 1 WEEK, TAKE 2 TABS PO X 1 WEEK, TAKE 1 TAB PO X 1 WEEK TABLET ORAL ONCE A DAY
Qty: 70 | Refills: 0 | OUTPATIENT
Start: 2025-06-03

## 2025-06-04 ENCOUNTER — TELEPHONE ENCOUNTER (OUTPATIENT)
Dept: URBAN - METROPOLITAN AREA CLINIC 23 | Facility: CLINIC | Age: 85
End: 2025-06-04

## 2025-06-06 ENCOUNTER — TELEPHONE ENCOUNTER (OUTPATIENT)
Dept: URBAN - METROPOLITAN AREA CLINIC 23 | Facility: CLINIC | Age: 85
End: 2025-06-06

## 2025-06-10 ENCOUNTER — OFFICE VISIT (OUTPATIENT)
Dept: URBAN - METROPOLITAN AREA CLINIC 23 | Facility: CLINIC | Age: 85
End: 2025-06-10
Payer: MEDICARE

## 2025-06-10 DIAGNOSIS — K51.90 ULCERATIVE COLITIS: ICD-10-CM

## 2025-06-10 PROCEDURE — 99213 OFFICE O/P EST LOW 20 MIN: CPT

## 2025-06-10 RX ORDER — PREDNISONE 10 MG/1
TAKE 4 TABS PO X 1 WEEK, TAKE 3 TABS PO X 1 WEEK, TAKE 2 TABS PO X 1 WEEK, TAKE 1 TAB PO X 1 WEEK TABLET ORAL ONCE A DAY
Qty: 70 | Refills: 0 | Status: ACTIVE | COMMUNITY
Start: 2025-06-03

## 2025-06-10 NOTE — HPI-TODAY'S VISIT:
85-year-old male here for follow-up.  He was last seen in clinic by me 05/29/2025 for follow-up after hospitalization for pneumonia and missed infliximab infusion.  He states that he was following outpatient with infectious disease and had 1 more day of outpatient antibiotics.  He complained of rectal bleeding and loose stools, 3-4 times per day.  No abdominal pain.  The plan discussed was stop Remicade and start Skyrizi.  Repeat labs with normal white blood cell count. rx prednison taper until skyrizi induction.   Today, he states he is scheduled for his first Skyrizi induction infusion June 13.  His second infusion scheduled for July 11 and third infusion scheduled for August 8.  He started prednisone taper. No rectal bleeding. 1-2 BM daily.

## 2025-06-13 ENCOUNTER — OFFICE VISIT (OUTPATIENT)
Dept: URBAN - METROPOLITAN AREA CLINIC 77 | Facility: CLINIC | Age: 85
End: 2025-06-13
Payer: MEDICARE

## 2025-06-13 DIAGNOSIS — K51.90 ACUTE ULCERATIVE COLITIS: ICD-10-CM

## 2025-06-13 PROCEDURE — 96415 CHEMO IV INFUSION ADDL HR: CPT | Performed by: INTERNAL MEDICINE

## 2025-06-13 PROCEDURE — 96413 CHEMO IV INFUSION 1 HR: CPT | Performed by: INTERNAL MEDICINE

## 2025-06-13 RX ORDER — PREDNISONE 10 MG/1
TAKE 4 TABS PO X 1 WEEK, TAKE 3 TABS PO X 1 WEEK, TAKE 2 TABS PO X 1 WEEK, TAKE 1 TAB PO X 1 WEEK TABLET ORAL ONCE A DAY
Qty: 70 | Refills: 0 | Status: ACTIVE | COMMUNITY
Start: 2025-06-03

## 2025-06-24 ENCOUNTER — TELEPHONE ENCOUNTER (OUTPATIENT)
Dept: URBAN - METROPOLITAN AREA CLINIC 23 | Facility: CLINIC | Age: 85
End: 2025-06-24

## 2025-07-09 ENCOUNTER — OFFICE VISIT (OUTPATIENT)
Dept: URBAN - METROPOLITAN AREA CLINIC 77 | Facility: CLINIC | Age: 85
End: 2025-07-09
Payer: MEDICARE

## 2025-07-09 DIAGNOSIS — K51.90 ACUTE ULCERATIVE COLITIS: ICD-10-CM

## 2025-07-09 PROCEDURE — 96413 CHEMO IV INFUSION 1 HR: CPT | Performed by: INTERNAL MEDICINE

## 2025-07-09 PROCEDURE — 96415 CHEMO IV INFUSION ADDL HR: CPT | Performed by: INTERNAL MEDICINE

## 2025-07-09 RX ORDER — PREDNISONE 10 MG/1
TAKE 4 TABS PO X 1 WEEK, TAKE 3 TABS PO X 1 WEEK, TAKE 2 TABS PO X 1 WEEK, TAKE 1 TAB PO X 1 WEEK TABLET ORAL ONCE A DAY
Qty: 70 | Refills: 0 | Status: ACTIVE | COMMUNITY
Start: 2025-06-03

## 2025-07-11 ENCOUNTER — OFFICE VISIT (OUTPATIENT)
Dept: URBAN - METROPOLITAN AREA CLINIC 77 | Facility: CLINIC | Age: 85
End: 2025-07-11

## 2025-07-18 ENCOUNTER — OFFICE VISIT (OUTPATIENT)
Dept: URBAN - METROPOLITAN AREA CLINIC 23 | Facility: CLINIC | Age: 85
End: 2025-07-18

## 2025-07-18 RX ORDER — PREDNISONE 10 MG/1
TAKE 4 TABS PO X 1 WEEK, TAKE 3 TABS PO X 1 WEEK, TAKE 2 TABS PO X 1 WEEK, TAKE 1 TAB PO X 1 WEEK TABLET ORAL ONCE A DAY
Qty: 70 | Refills: 0 | Status: ACTIVE | COMMUNITY
Start: 2025-06-03

## 2025-08-08 ENCOUNTER — OFFICE VISIT (OUTPATIENT)
Dept: URBAN - METROPOLITAN AREA CLINIC 77 | Facility: CLINIC | Age: 85
End: 2025-08-08
Payer: MEDICARE

## 2025-08-08 DIAGNOSIS — K51.90 ULCERATIVE COLITIS: ICD-10-CM

## 2025-08-08 PROCEDURE — 96415 CHEMO IV INFUSION ADDL HR: CPT | Performed by: INTERNAL MEDICINE

## 2025-08-08 PROCEDURE — 96413 CHEMO IV INFUSION 1 HR: CPT | Performed by: INTERNAL MEDICINE

## 2025-08-08 RX ORDER — PREDNISONE 10 MG/1
TAKE 4 TABS PO X 1 WEEK, TAKE 3 TABS PO X 1 WEEK, TAKE 2 TABS PO X 1 WEEK, TAKE 1 TAB PO X 1 WEEK TABLET ORAL ONCE A DAY
Qty: 70 | Refills: 0 | Status: ACTIVE | COMMUNITY
Start: 2025-06-03

## 2025-08-19 ENCOUNTER — OFFICE VISIT (OUTPATIENT)
Dept: URBAN - METROPOLITAN AREA CLINIC 23 | Facility: CLINIC | Age: 85
End: 2025-08-19
Payer: MEDICARE

## 2025-08-19 DIAGNOSIS — K51.90 ULCERATIVE COLITIS: ICD-10-CM

## 2025-08-19 PROCEDURE — 99213 OFFICE O/P EST LOW 20 MIN: CPT

## 2025-08-25 LAB
A/G RATIO: 1.9
ALBUMIN: 4
ALKALINE PHOSPHATASE: 35
ALT (SGPT): 13
AST (SGOT): 21
BILIRUBIN, TOTAL: 0.5
BUN/CREATININE RATIO: 21
BUN: 38
C-REACTIVE PROTEIN, QUANT: <3
CALCIUM: 9.4
CARBON DIOXIDE, TOTAL: 22
CHLORIDE: 107
CREATININE: 1.8
EGFR: 36
GLOBULIN, TOTAL: 2.1
GLUCOSE: 122
POTASSIUM: 4.6
PROTEIN, TOTAL: 6.1
SODIUM: 139